# Patient Record
Sex: FEMALE | Race: WHITE | NOT HISPANIC OR LATINO | Employment: OTHER | URBAN - METROPOLITAN AREA
[De-identification: names, ages, dates, MRNs, and addresses within clinical notes are randomized per-mention and may not be internally consistent; named-entity substitution may affect disease eponyms.]

---

## 2021-03-19 ENCOUNTER — IMMUNIZATION (OUTPATIENT)
Dept: NURSING | Facility: CLINIC | Age: 66
End: 2021-03-19
Payer: OTHER GOVERNMENT

## 2021-03-19 PROCEDURE — 0011A PR COVID VAC MODERNA 100 MCG/0.5 ML IM: CPT

## 2021-03-19 PROCEDURE — 91301 PR COVID VAC MODERNA 100 MCG/0.5 ML IM: CPT

## 2021-04-16 ENCOUNTER — IMMUNIZATION (OUTPATIENT)
Dept: NURSING | Facility: CLINIC | Age: 66
End: 2021-04-16
Attending: PEDIATRICS
Payer: OTHER GOVERNMENT

## 2021-04-16 PROCEDURE — 0012A PR COVID VAC MODERNA 100 MCG/0.5 ML IM: CPT

## 2021-04-16 PROCEDURE — 91301 PR COVID VAC MODERNA 100 MCG/0.5 ML IM: CPT

## 2021-05-01 ENCOUNTER — HEALTH MAINTENANCE LETTER (OUTPATIENT)
Age: 66
End: 2021-05-01

## 2021-10-11 ENCOUNTER — HEALTH MAINTENANCE LETTER (OUTPATIENT)
Age: 66
End: 2021-10-11

## 2022-05-22 ENCOUNTER — HEALTH MAINTENANCE LETTER (OUTPATIENT)
Age: 67
End: 2022-05-22

## 2022-09-25 ENCOUNTER — HEALTH MAINTENANCE LETTER (OUTPATIENT)
Age: 67
End: 2022-09-25

## 2023-01-30 ENCOUNTER — HEALTH MAINTENANCE LETTER (OUTPATIENT)
Age: 68
End: 2023-01-30

## 2023-05-08 ENCOUNTER — HEALTH MAINTENANCE LETTER (OUTPATIENT)
Age: 68
End: 2023-05-08

## 2024-03-02 ENCOUNTER — HEALTH MAINTENANCE LETTER (OUTPATIENT)
Age: 69
End: 2024-03-02

## 2024-08-15 ENCOUNTER — APPOINTMENT (OUTPATIENT)
Dept: RADIOLOGY | Facility: HOSPITAL | Age: 69
DRG: 195 | End: 2024-08-15
Attending: EMERGENCY MEDICINE
Payer: COMMERCIAL

## 2024-08-15 ENCOUNTER — HOSPITAL ENCOUNTER (INPATIENT)
Facility: HOSPITAL | Age: 69
LOS: 2 days | Discharge: HOME | DRG: 195 | End: 2024-08-17
Attending: EMERGENCY MEDICINE | Admitting: INTERNAL MEDICINE
Payer: COMMERCIAL

## 2024-08-15 DIAGNOSIS — J18.9 COMMUNITY ACQUIRED PNEUMONIA, UNSPECIFIED LATERALITY: ICD-10-CM

## 2024-08-15 LAB
ANION GAP SERPL CALCULATED.3IONS-SCNC: 12 MMOL/L (ref 7–15)
BUN SERPL-MCNC: 10.9 MG/DL (ref 8–23)
C PNEUM DNA SPEC QL NAA+PROBE: NOT DETECTED
CALCIUM SERPL-MCNC: 9.2 MG/DL (ref 8.8–10.4)
CHLORIDE SERPL-SCNC: 103 MMOL/L (ref 98–107)
CREAT SERPL-MCNC: 0.7 MG/DL (ref 0.51–0.95)
EGFRCR SERPLBLD CKD-EPI 2021: >90 ML/MIN/1.73M2
ERYTHROCYTE [DISTWIDTH] IN BLOOD BY AUTOMATED COUNT: 12.5 % (ref 10–15)
FLUAV H1 2009 PAND RNA SPEC QL NAA+PROBE: NOT DETECTED
FLUAV H1 RNA SPEC QL NAA+PROBE: NOT DETECTED
FLUAV H3 RNA SPEC QL NAA+PROBE: NOT DETECTED
FLUAV RNA SPEC QL NAA+PROBE: NEGATIVE
FLUAV RNA SPEC QL NAA+PROBE: NOT DETECTED
FLUBV RNA RESP QL NAA+PROBE: NEGATIVE
FLUBV RNA SPEC QL NAA+PROBE: NOT DETECTED
GLUCOSE SERPL-MCNC: 100 MG/DL (ref 70–99)
HADV DNA SPEC QL NAA+PROBE: NOT DETECTED
HCO3 SERPL-SCNC: 25 MMOL/L (ref 22–29)
HCOV PNL SPEC NAA+PROBE: NOT DETECTED
HCT VFR BLD AUTO: 42.9 % (ref 35–47)
HGB BLD-MCNC: 14.4 G/DL (ref 11.7–15.7)
HMPV RNA SPEC QL NAA+PROBE: NOT DETECTED
HPIV1 RNA SPEC QL NAA+PROBE: NOT DETECTED
HPIV2 RNA SPEC QL NAA+PROBE: NOT DETECTED
HPIV3 RNA SPEC QL NAA+PROBE: NOT DETECTED
HPIV4 RNA SPEC QL NAA+PROBE: NOT DETECTED
L PNEUMO1 AG UR QL IA: NEGATIVE
M PNEUMO DNA SPEC QL NAA+PROBE: NOT DETECTED
MCH RBC QN AUTO: 32.6 PG (ref 26.5–33)
MCHC RBC AUTO-ENTMCNC: 33.6 G/DL (ref 31.5–36.5)
MCV RBC AUTO: 97 FL (ref 78–100)
NT-PROBNP SERPL-MCNC: <36 PG/ML (ref 0–900)
PLATELET # BLD AUTO: 282 10E3/UL (ref 150–450)
POTASSIUM SERPL-SCNC: 4.2 MMOL/L (ref 3.4–5.3)
PROCALCITONIN SERPL IA-MCNC: 0.04 NG/ML
RBC # BLD AUTO: 4.42 10E6/UL (ref 3.8–5.2)
RSV RNA SPEC NAA+PROBE: NEGATIVE
RSV RNA SPEC QL NAA+PROBE: NOT DETECTED
RSV RNA SPEC QL NAA+PROBE: NOT DETECTED
RV+EV RNA SPEC QL NAA+PROBE: NOT DETECTED
S PNEUM AG SPEC QL: NEGATIVE
SARS-COV-2 RNA RESP QL NAA+PROBE: NEGATIVE
SODIUM SERPL-SCNC: 140 MMOL/L (ref 135–145)
TROPONIN T SERPL HS-MCNC: 7 NG/L
WBC # BLD AUTO: 9.4 10E3/UL (ref 4–11)

## 2024-08-15 PROCEDURE — 87633 RESP VIRUS 12-25 TARGETS: CPT | Performed by: INTERNAL MEDICINE

## 2024-08-15 PROCEDURE — 83880 ASSAY OF NATRIURETIC PEPTIDE: CPT | Performed by: INTERNAL MEDICINE

## 2024-08-15 PROCEDURE — 84145 PROCALCITONIN (PCT): CPT | Performed by: INTERNAL MEDICINE

## 2024-08-15 PROCEDURE — 87637 SARSCOV2&INF A&B&RSV AMP PRB: CPT | Performed by: INTERNAL MEDICINE

## 2024-08-15 PROCEDURE — G0378 HOSPITAL OBSERVATION PER HR: HCPCS

## 2024-08-15 PROCEDURE — 250N000013 HC RX MED GY IP 250 OP 250 PS 637: Performed by: INTERNAL MEDICINE

## 2024-08-15 PROCEDURE — 99223 1ST HOSP IP/OBS HIGH 75: CPT | Performed by: INTERNAL MEDICINE

## 2024-08-15 PROCEDURE — 80048 BASIC METABOLIC PNL TOTAL CA: CPT | Performed by: EMERGENCY MEDICINE

## 2024-08-15 PROCEDURE — 120N000001 HC R&B MED SURG/OB

## 2024-08-15 PROCEDURE — 258N000003 HC RX IP 258 OP 636: Performed by: EMERGENCY MEDICINE

## 2024-08-15 PROCEDURE — 96374 THER/PROPH/DIAG INJ IV PUSH: CPT | Mod: 59

## 2024-08-15 PROCEDURE — 250N000011 HC RX IP 250 OP 636: Performed by: EMERGENCY MEDICINE

## 2024-08-15 PROCEDURE — 99285 EMERGENCY DEPT VISIT HI MDM: CPT | Mod: 25

## 2024-08-15 PROCEDURE — 96375 TX/PRO/DX INJ NEW DRUG ADDON: CPT

## 2024-08-15 PROCEDURE — 250N000009 HC RX 250: Performed by: EMERGENCY MEDICINE

## 2024-08-15 PROCEDURE — 94640 AIRWAY INHALATION TREATMENT: CPT

## 2024-08-15 PROCEDURE — 94640 AIRWAY INHALATION TREATMENT: CPT | Mod: 76

## 2024-08-15 PROCEDURE — 36415 COLL VENOUS BLD VENIPUNCTURE: CPT | Performed by: EMERGENCY MEDICINE

## 2024-08-15 PROCEDURE — 87205 SMEAR GRAM STAIN: CPT | Performed by: INTERNAL MEDICINE

## 2024-08-15 PROCEDURE — 36415 COLL VENOUS BLD VENIPUNCTURE: CPT | Performed by: INTERNAL MEDICINE

## 2024-08-15 PROCEDURE — 250N000011 HC RX IP 250 OP 636: Performed by: INTERNAL MEDICINE

## 2024-08-15 PROCEDURE — 84484 ASSAY OF TROPONIN QUANT: CPT | Performed by: INTERNAL MEDICINE

## 2024-08-15 PROCEDURE — 96372 THER/PROPH/DIAG INJ SC/IM: CPT

## 2024-08-15 PROCEDURE — 87899 AGENT NOS ASSAY W/OPTIC: CPT | Performed by: INTERNAL MEDICINE

## 2024-08-15 PROCEDURE — 999N000157 HC STATISTIC RCP TIME EA 10 MIN

## 2024-08-15 PROCEDURE — 999N000156 HC STATISTIC RCP CONSULT EA 30 MIN

## 2024-08-15 PROCEDURE — 71046 X-RAY EXAM CHEST 2 VIEWS: CPT

## 2024-08-15 PROCEDURE — 85027 COMPLETE CBC AUTOMATED: CPT | Performed by: EMERGENCY MEDICINE

## 2024-08-15 PROCEDURE — 87040 BLOOD CULTURE FOR BACTERIA: CPT | Performed by: INTERNAL MEDICINE

## 2024-08-15 PROCEDURE — 250N000009 HC RX 250: Performed by: INTERNAL MEDICINE

## 2024-08-15 PROCEDURE — 96365 THER/PROPH/DIAG IV INF INIT: CPT

## 2024-08-15 RX ORDER — ALBUTEROL SULFATE 90 UG/1
2 AEROSOL, METERED RESPIRATORY (INHALATION) EVERY 6 HOURS PRN
Status: ON HOLD | COMMUNITY
End: 2024-08-17

## 2024-08-15 RX ORDER — CALCIUM CARBONATE/VITAMIN D3 600 MG-10
1 TABLET ORAL DAILY
COMMUNITY

## 2024-08-15 RX ORDER — PROCHLORPERAZINE MALEATE 5 MG
5 TABLET ORAL EVERY 6 HOURS PRN
Status: DISCONTINUED | OUTPATIENT
Start: 2024-08-15 | End: 2024-08-17 | Stop reason: HOSPADM

## 2024-08-15 RX ORDER — ONDANSETRON 2 MG/ML
4 INJECTION INTRAMUSCULAR; INTRAVENOUS EVERY 6 HOURS PRN
Status: DISCONTINUED | OUTPATIENT
Start: 2024-08-15 | End: 2024-08-17 | Stop reason: HOSPADM

## 2024-08-15 RX ORDER — IPRATROPIUM BROMIDE AND ALBUTEROL SULFATE 2.5; .5 MG/3ML; MG/3ML
3 SOLUTION RESPIRATORY (INHALATION) EVERY 4 HOURS PRN
Status: DISCONTINUED | OUTPATIENT
Start: 2024-08-15 | End: 2024-08-15

## 2024-08-15 RX ORDER — CEFTRIAXONE 1 G/1
1 INJECTION, POWDER, FOR SOLUTION INTRAMUSCULAR; INTRAVENOUS EVERY 24 HOURS
Status: DISCONTINUED | OUTPATIENT
Start: 2024-08-16 | End: 2024-08-16

## 2024-08-15 RX ORDER — AMOXICILLIN 250 MG
2 CAPSULE ORAL 2 TIMES DAILY PRN
Status: DISCONTINUED | OUTPATIENT
Start: 2024-08-15 | End: 2024-08-17 | Stop reason: HOSPADM

## 2024-08-15 RX ORDER — LANOLIN ALCOHOL/MO/W.PET/CERES
3 CREAM (GRAM) TOPICAL
Status: DISCONTINUED | OUTPATIENT
Start: 2024-08-15 | End: 2024-08-15

## 2024-08-15 RX ORDER — MULTIVITAMIN,THERAPEUTIC
1 TABLET ORAL DAILY
COMMUNITY

## 2024-08-15 RX ORDER — POLYETHYLENE GLYCOL 3350 17 G/17G
17 POWDER, FOR SOLUTION ORAL 2 TIMES DAILY PRN
Status: DISCONTINUED | OUTPATIENT
Start: 2024-08-15 | End: 2024-08-17 | Stop reason: HOSPADM

## 2024-08-15 RX ORDER — ACETAMINOPHEN 650 MG/1
650 SUPPOSITORY RECTAL EVERY 4 HOURS PRN
Status: DISCONTINUED | OUTPATIENT
Start: 2024-08-15 | End: 2024-08-17 | Stop reason: HOSPADM

## 2024-08-15 RX ORDER — IPRATROPIUM BROMIDE AND ALBUTEROL SULFATE 2.5; .5 MG/3ML; MG/3ML
3 SOLUTION RESPIRATORY (INHALATION) 4 TIMES DAILY
Status: DISCONTINUED | OUTPATIENT
Start: 2024-08-15 | End: 2024-08-17 | Stop reason: HOSPADM

## 2024-08-15 RX ORDER — PHENOL 1.4 %
10 AEROSOL, SPRAY (ML) MUCOUS MEMBRANE
COMMUNITY

## 2024-08-15 RX ORDER — MULTIVITAMIN,THERAPEUTIC
1 TABLET ORAL DAILY
Status: DISCONTINUED | OUTPATIENT
Start: 2024-08-15 | End: 2024-08-17 | Stop reason: HOSPADM

## 2024-08-15 RX ORDER — ONDANSETRON 4 MG/1
4 TABLET, ORALLY DISINTEGRATING ORAL EVERY 6 HOURS PRN
Status: DISCONTINUED | OUTPATIENT
Start: 2024-08-15 | End: 2024-08-17 | Stop reason: HOSPADM

## 2024-08-15 RX ORDER — POLYETHYLENE GLYCOL 3350 17 G/17G
0.25 POWDER, FOR SOLUTION ORAL DAILY
COMMUNITY

## 2024-08-15 RX ORDER — HYDRALAZINE HYDROCHLORIDE 20 MG/ML
10 INJECTION INTRAMUSCULAR; INTRAVENOUS EVERY 4 HOURS PRN
Status: DISCONTINUED | OUTPATIENT
Start: 2024-08-15 | End: 2024-08-17 | Stop reason: HOSPADM

## 2024-08-15 RX ORDER — ACETAMINOPHEN 325 MG/1
650 TABLET ORAL EVERY 4 HOURS PRN
Status: DISCONTINUED | OUTPATIENT
Start: 2024-08-15 | End: 2024-08-17 | Stop reason: HOSPADM

## 2024-08-15 RX ORDER — AMOXICILLIN 250 MG
1 CAPSULE ORAL 2 TIMES DAILY PRN
Status: DISCONTINUED | OUTPATIENT
Start: 2024-08-15 | End: 2024-08-17 | Stop reason: HOSPADM

## 2024-08-15 RX ORDER — VITAMIN B COMPLEX
50 TABLET ORAL DAILY
Status: DISCONTINUED | OUTPATIENT
Start: 2024-08-15 | End: 2024-08-17 | Stop reason: HOSPADM

## 2024-08-15 RX ORDER — IPRATROPIUM BROMIDE AND ALBUTEROL SULFATE 2.5; .5 MG/3ML; MG/3ML
3 SOLUTION RESPIRATORY (INHALATION) ONCE
Status: COMPLETED | OUTPATIENT
Start: 2024-08-15 | End: 2024-08-15

## 2024-08-15 RX ORDER — BENZONATATE 100 MG/1
200 CAPSULE ORAL 3 TIMES DAILY PRN
Status: DISCONTINUED | OUTPATIENT
Start: 2024-08-15 | End: 2024-08-17 | Stop reason: HOSPADM

## 2024-08-15 RX ORDER — GUAIFENESIN/DEXTROMETHORPHAN 100-10MG/5
10 SYRUP ORAL EVERY 4 HOURS PRN
Status: DISCONTINUED | OUTPATIENT
Start: 2024-08-15 | End: 2024-08-17 | Stop reason: HOSPADM

## 2024-08-15 RX ORDER — CALCIUM CARBONATE 500 MG/1
1000 TABLET, CHEWABLE ORAL 4 TIMES DAILY PRN
Status: DISCONTINUED | OUTPATIENT
Start: 2024-08-15 | End: 2024-08-17 | Stop reason: HOSPADM

## 2024-08-15 RX ORDER — CEFTRIAXONE 1 G/1
1 INJECTION, POWDER, FOR SOLUTION INTRAMUSCULAR; INTRAVENOUS ONCE
Status: COMPLETED | OUTPATIENT
Start: 2024-08-15 | End: 2024-08-15

## 2024-08-15 RX ORDER — ENOXAPARIN SODIUM 100 MG/ML
40 INJECTION SUBCUTANEOUS EVERY 24 HOURS
Status: DISCONTINUED | OUTPATIENT
Start: 2024-08-15 | End: 2024-08-17 | Stop reason: HOSPADM

## 2024-08-15 RX ORDER — PROCHLORPERAZINE 25 MG
12.5 SUPPOSITORY, RECTAL RECTAL EVERY 12 HOURS PRN
Status: DISCONTINUED | OUTPATIENT
Start: 2024-08-15 | End: 2024-08-17 | Stop reason: HOSPADM

## 2024-08-15 RX ADMIN — IPRATROPIUM BROMIDE AND ALBUTEROL SULFATE 3 ML: .5; 3 SOLUTION RESPIRATORY (INHALATION) at 17:09

## 2024-08-15 RX ADMIN — IPRATROPIUM BROMIDE AND ALBUTEROL SULFATE 3 ML: .5; 3 SOLUTION RESPIRATORY (INHALATION) at 14:27

## 2024-08-15 RX ADMIN — IPRATROPIUM BROMIDE AND ALBUTEROL SULFATE 3 ML: .5; 3 SOLUTION RESPIRATORY (INHALATION) at 20:41

## 2024-08-15 RX ADMIN — ENOXAPARIN SODIUM 40 MG: 40 INJECTION SUBCUTANEOUS at 21:45

## 2024-08-15 RX ADMIN — IPRATROPIUM BROMIDE AND ALBUTEROL SULFATE 3 ML: .5; 3 SOLUTION RESPIRATORY (INHALATION) at 09:45

## 2024-08-15 RX ADMIN — AZITHROMYCIN 500 MG: 500 INJECTION, POWDER, LYOPHILIZED, FOR SOLUTION INTRAVENOUS at 10:47

## 2024-08-15 RX ADMIN — Medication 50 MCG: at 15:00

## 2024-08-15 RX ADMIN — THERA TABS 1 TABLET: TAB at 15:00

## 2024-08-15 RX ADMIN — CEFTRIAXONE SODIUM 1 G: 1 INJECTION, POWDER, FOR SOLUTION INTRAMUSCULAR; INTRAVENOUS at 10:03

## 2024-08-15 RX ADMIN — Medication 1 TABLET: at 15:00

## 2024-08-15 ASSESSMENT — ACTIVITIES OF DAILY LIVING (ADL)
ADLS_ACUITY_SCORE: 35
ADLS_ACUITY_SCORE: 18
ADLS_ACUITY_SCORE: 35
ADLS_ACUITY_SCORE: 18

## 2024-08-15 ASSESSMENT — COLUMBIA-SUICIDE SEVERITY RATING SCALE - C-SSRS
6. HAVE YOU EVER DONE ANYTHING, STARTED TO DO ANYTHING, OR PREPARED TO DO ANYTHING TO END YOUR LIFE?: NO
2. HAVE YOU ACTUALLY HAD ANY THOUGHTS OF KILLING YOURSELF IN THE PAST MONTH?: NO
1. IN THE PAST MONTH, HAVE YOU WISHED YOU WERE DEAD OR WISHED YOU COULD GO TO SLEEP AND NOT WAKE UP?: NO

## 2024-08-15 NOTE — H&P
[facility]    History and Physical - Hospitalist Service       Date of Admission:  8/15/2024    Assessment & Plan   Active Problems:    Community acquired pneumonia, unspecified laterality       Gay Dennison is a 68 year old female with history of recent pneumonia/bronchitis (8/5/24) who is admitted on 8/15/2024 with concern for recurrent pneumonia and respiratory distress.     Acute respiratory distress  [place]  Concern for recurrent pneumonia  No evidence of hypoxia   Not improved s/p course of levaquin and limited course of doxycycline and prednisone.   CTA chest from 3 days prior to admission showed bronchial inflammation with mucus plugging and patchy pulmonary infiltrates all typical of pneumonia.   CXR on day of admission shows bronchial wall thickening with similar appearing reticulonodular opacities left upper lobe, lingula and right lower lobe compatible with bronchitis/bronchiolitis.   Procal normal and WBC normal  BNP also normal  -- recheck COVID/RSV/Flu and respiratory viral panel  -- check urine legionella and strep pneumo antigen  -- check blood and sputum cultures  -- started on ceftriaxone and azithromycin in the ED, will continue for now  -- consider restarting course of steroids pending above workup  -- start duonebs, tessalon and robitussin      H/O insomnia: continue home melatonin       Diet: Regular Diet Adult    DVT Prophylaxis: Enoxaparin (Lovenox) SQ  Freeman Catheter: Not present  Lines: None     Cardiac Monitoring: None  Code Status: Full Code      Clinically Significant Risk Factors Present on Admission                                           Disposition Plan      Expected Discharge Date: 08/17/2024             Medically Ready for Discharge: Anticipated Tomorrow      Gavin Hanson DO  Hospitalist Service  [facility]  Securely message with [employer] (more info)  Text page via [employer] Paging/Directory      ______________________________________________________________________    Chief Complaint   SOB    History is obtained from the patient    History of Present Illness   Gay Dennison is a 68 year old female who was recently treated for CAP with course of doxycicline, steroids and then course of levaquin and now presents ongoing SOB and productive cough with associate fatigue      Past Medical History    No past medical history on file.    Past Surgical History   No past surgical history on file.    Prior to Admission Medications   Prior to Admission Medications   Prescriptions Last Dose Informant Patient Reported? Taking?   Melatonin 10 MG TABS tablet Unknown at prn  Yes Yes   Sig: Take 10 mg by mouth nightly as needed for sleep   albuterol (PROAIR HFA/PROVENTIL HFA/VENTOLIN HFA) 108 (90 Base) MCG/ACT inhaler Unknown at prn  Yes Yes   Sig: Inhale 2 puffs into the lungs every 6 hours as needed for shortness of breath, wheezing or cough   calcium carbonate-vitamin D (CALTRATE) 600-10 MG-MCG per tablet 8/14/2024 at am  Yes Yes   Sig: Take 1 tablet by mouth daily   cholecalciferol (VITAMIN D3) 25 mcg (1000 units) capsule 8/14/2024 at am  Yes Yes   Sig: Take 2 capsules by mouth daily   multivitamin, therapeutic (THERA-VIT) TABS tablet 8/14/2024 at am`c  Yes Yes   Sig: Take 1 tablet by mouth daily   polyethylene glycol (MIRALAX) 17 GM/Dose powder 8/14/2024 at am  Yes Yes   Sig: Take 0.25 Capfuls by mouth daily      Facility-Administered Medications: None           Physical Exam   Vital Signs: Temp: 98  F (36.7  C) Temp src: Oral BP: 103/56 Pulse: 94   Resp: 20 SpO2: 91 % O2 Device: None (Room air)    Weight: 104 lbs 11.5 oz    General Appearance: In no acute distress  RESPIRATORY: Respirations nonlabored  CARDIOVASCULAR: No le edema bilat.  NEUROLOGIC: No focal arm or leg  weakness, speech is clear         Medical Decision Making       >75 MINUTES SPENT BY ME on the date of service doing chart review,  history, exam, documentation & further activities per the note.      Data

## 2024-08-15 NOTE — ED TRIAGE NOTES
Diagnosed to have pneumonia  and was given for 5 days and felt better but yesterday felt that it is hard to breath.  Still has occasional productive cough, yellowish phlegm.  Tied Mucinex for a day but did not help.  CT of chest done at [facility] and was negative PE     Triage Assessment (Adult)       Row Name 08/15/24 0733          Triage Assessment    Airway WDL WDL        Respiratory WDL    Respiratory WDL cough;X  feeling short of breath     Cough Frequency infrequent        Skin Circulation/Temperature WDL    Skin Circulation/Temperature WDL WDL        Cardiac WDL    Cardiac WDL WDL        Peripheral/Neurovascular WDL    Peripheral Neurovascular WDL WDL        Cognitive/Neuro/Behavioral WDL    Cognitive/Neuro/Behavioral WDL WDL

## 2024-08-15 NOTE — PHARMACY-ADMISSION MEDICATION HISTORY
Pharmacist Admission Medication History    Admission medication history is complete. The information provided in this note is only as accurate as the sources available at the time of the update.    Information Source(s): Patient and CareEverywhere/SureScripts via in-person    Pertinent Information: just takes OTC meds, has received both PO doxy and levaquin to treat PNA    Changes made to PTA medication list:  Added: all  Deleted: None  Changed: None    Allergies reviewed with patient and updates made in EHR: yes    Medication History Completed By: ZAHIDA CASTANEDA [facility] 8/15/2024 11:12 AM    PTA Med List   Medication Sig Last Dose    albuterol (PROAIR HFA/PROVENTIL HFA/VENTOLIN HFA) 108 (90 Base) MCG/ACT inhaler Inhale 2 puffs into the lungs every 6 hours as needed for shortness of breath, wheezing or cough Unknown at prn    calcium carbonate-vitamin D (CALTRATE) 600-10 MG-MCG per tablet Take 1 tablet by mouth daily 8/14/2024 at am    cholecalciferol (VITAMIN D3) 25 mcg (1000 units) capsule Take 2 capsules by mouth daily 8/14/2024 at am    Melatonin 10 MG TABS tablet Take 10 mg by mouth nightly as needed for sleep Unknown at prn    multivitamin, therapeutic (THERA-VIT) TABS tablet Take 1 tablet by mouth daily 8/14/2024 at am`c    polyethylene glycol (MIRALAX) 17 GM/Dose powder Take 0.25 Capfuls by mouth daily 8/14/2024 at am

## 2024-08-15 NOTE — PLAN OF CARE
RCAT Treatment Plan    Patient Score: 9  Patient Acuity: 4    Clinical Indication for Therapy: Pneumonia    Therapy Ordered: Duoneb QID and PRN    Assessment Summary: Patient is on room air. Breath sounds are inspiratory, expiratory wheezes and fine crackles. Plan is to continue current therapy per MD orders and re-evaluate in 72 hours.    Leah Harman, RT  8/15/2024

## 2024-08-15 NOTE — ED NOTES
"[facility] ED Handoff Report    ED Chief Complaint: shortness of breath    ED Diagnosis:  (J18.9) Community acquired pneumonia, unspecified laterality       PMH:  No past medical history on file.     Code Status:  Full Code     Falls Risk: No Band: Not applicable    Current Living Situation/Residence: lives in a house     Elimination Status: Continent: Yes     Activity Level: Independent    Patients Preferred Language:  English     Needed: No    Vital Signs:  /61   Pulse 78   Temp 98  F (36.7  C) (Temporal)   Resp 23   Ht 1.562 m (5' 1.5\")   Wt 47.5 kg (104 lb 11.5 oz)   SpO2 93%   BMI 19.47 kg/m       Cardiac Rhythm: NA    Pain Score: 0/10    Is the Patient Confused:  No    Last Food or Drink: 08/15/24 at breakfast    Focused Assessment:  Alert and oriented. Denies pain. Shortness of breath with heavy exertion.  Walks independently well. Was told by primary MD that she was to be on bedrest so she's a bit frustrated through the process.     Tests Performed: Done: Labs and Imaging    Treatments Provided:  IV abx    Family Dynamics/Concerns: No    Family Updated On Visitor Policy: No    Plan of Care Communicated to Family: No    Who Was Updated about Plan of Care: can update family self    Belongings Checklist Done and Signed by Patient: Yes    Belongings Sent with Patient: clothing, purse    Medications sent with patient: NA    Covid: symptomatic, negative    Additional Information:     RN: Kalyn Vazquez RN 8/15/2024 11:41 AM        "

## 2024-08-15 NOTE — ED PROVIDER NOTES
EMERGENCY DEPARTMENT ENCOUnter      NAME: Gay Dennison  AGE: 68 year old female  YOB: 1955  MRN: [id number]  EVALUATION DATE & TIME: 8/15/2024  7:41 AM    PCP: No Ref-Primary, Physician    ED PROVIDER: Brian Elaine DO      Chief Complaint   Patient presents with    Shortness of Breath         FINAL IMPRESSION:  1. Community acquired pneumonia, unspecified laterality          ED COURSE & MEDICAL DECISION MAKIN:30 AM I met with the patient, obtained history, performed an initial exam, and discussed options and plan for diagnostics and treatment here in the ED.  9:32 AM Spoke with Radiologist.  9:39 AM Rechecked and updated patient on lab and imaging results. Discussed plan for admission and starting IV antibiotics and patient is agreeable.  10:12 AM Spoke with Dr. Hanson, Hospitalist, regarding plan for admission. Patient is accepted for admission.      The patient presented to the emergency department today complaining of shortness of breath.  She was diagnosed with pneumonia last week and finished a course of Levaquin but states that her symptoms have not improved.  She has been vitally stable throughout her ER stay.  Chest x-ray today shows more significant findings then to her imaging last week.  Given that she has failed outpatient therapy, we will start IV antibiotics and keep her in the hospital.  She is comfortable with this plan.        Medical Decision Making  Obtained supplemental history:Supplemental history obtained?: Documented in chart  Reviewed external records: External records reviewed?: Outpatient Record: UC visits at [facility] for persistent worsening productive cough, body aches, fevers, chest congestion, mild shortness of breath, 1 week of sore throat, and rib pain secondary to coughing on 8/3/24 and 24. Follow up visit at [facility] and CT chest PE study on 24  Care impacted by chronic illness:N/A  Care  significantly affected by social determinants of health:Access to Affordable Health Care  Did you consider but not order tests?: Work up considered but not performed and documented in chart, if applicable  Did you interpret images independently?: Independent interpretation of ECG and images noted in documentation, when applicable.  Consultation discussion with other provider:Did you involve another provider (consultant, , pharmacy, etc.)?: I discussed the care with another health care provider, see documentation for details.  Admit.    At the conclusion of the encounter I discussed the results of all of the tests and the disposition. The questions were answered. The patient or family acknowledged understanding and was agreeable with the care plan.         =================================================================    HPI        Gay Dennison is a 68 year old female with no pertinent medical history, who presents to this ED via private car for evaluation of persistent shortness of breath.    Per chart review, the patient presented to [facility] for worsening cough, body aches, and fevers on 8/3/24. Patient took an at-home COVID-19 test, which came back negative. No personal history of reactive airway disease. She was given a nebulizer treatment at the ED. She was discharged with an albuterol inhaler, 20 mg of prednisone, and 100 mg of doxycycline (to take from 8/3 until 8/10).    She presented to [facility] again on 8/5/24 with complaint of 1.5 weeks of persistent worsening/significant productive cough, chest congestion, mild shortness of breath, 1 week of sore throat, and rib pain secondary to coughing. Her COVID test at home was negative. Patient had a chest XR that showed that her heart size and pulmonary vascularity was normal. There may have been some bronchiectasis in the upper lungs, but no consolidative infiltrate or effusion was found. She was  "discharged with 750 mg of levofloxacin after this visit.    Patient presented [facility] for follow-up bronchitis presumed bronchial on 8/12/24. She reported switch to Levaquin abx has helped dramatically and she felt much better. Noted she hasn't been using her inhaler and has christopher going on short walks without issues. She had a CT chest PE study done on visit, which showed infiltrate bilaterally and bronchial plugging, but no PE.     Patient reports she was diagnosed with pneumonia 10 days ago (on 8/5) after presenting to  at [facility] for a worsening productive cough, chest congestion, mild shortness of breath, and rib pain secondary to coughing. She had a chest XR done at  on 8/12 to rule out PE because her O2 sats were low, noting scan was negative for PE and cancer. Notes she still feels short of breath yesterday and today and her breathing feels \"tight\". She finished a 5-day course of levofloxacin from 8/5 until 8/9 and reports the past 3 days, she hasn't been improving much. States her cough subsided substantially after finishing course of levofloxacin.    This shortness of breath worsens with exertion and notes she was told by the  provider that she shouldn't exert herself much and rest more. She endorses back tightness, which is normal for her. She hasn't been using the inhaler she was given at  for several weeks and states it didn't help much. She denies chest pain or persistent coughing. No other reported complaints or concerns at this time.      PAST MEDICAL HISTORY:  No past medical history on file.    PAST SURGICAL HISTORY:  No past surgical history on file.        CURRENT MEDICATIONS:    No current outpatient medications on file.      ALLERGIES:  No Known Allergies    FAMILY HISTORY:  No family history on file.    SOCIAL HISTORY:   Social History     Socioeconomic History    Marital status:      Social Determinants of Health     Financial " "Resource Strain: Low Risk  (2/13/2024)    Received from [facility]    Financial Resource Strain     Difficulty of Paying Living Expenses: 3   Food Insecurity: No Food Insecurity (2/13/2024)    Received from [facility]    Food Insecurity     Worried About Running Out of Food in the Last Year: 1   Transportation Needs: No Transportation Needs (2/13/2024)    Received from [facility]    Transportation Needs     Lack of Transportation (Medical): 1   Social Connections: Socially Integrated (2/13/2024)    Received from [facility]    Social Connections     Frequency of Communication with Friends and Family: 0   Housing Stability: Low Risk  (2/13/2024)    Received from [facility]    Housing Stability     Unable to Pay for Housing in the Last Year: 1       VITALS:  Patient Vitals for the past 24 hrs:   BP Temp Temp src Pulse Resp SpO2 Height Weight   08/15/24 1427 -- -- -- -- -- 93 % -- --   08/15/24 1217 103/56 98  F (36.7  C) Oral 94 20 91 % -- --   08/15/24 0945 120/61 -- -- 78 23 93 % -- --   08/15/24 0930 115/57 -- -- 85 28 93 % 1.562 m (5' 1.5\") --   08/15/24 0830 116/59 -- -- 75 -- 91 % -- --   08/15/24 0810 114/62 -- -- 80 -- 93 % -- --   08/15/24 0800 115/71 -- -- 74 -- 92 % -- --   08/15/24 0731 125/61 98  F (36.7  C) Temporal 83 22 93 % -- 47.5 kg (104 lb 11.5 oz)       PHYSICAL EXAM    Constitutional:  Well developed, Well nourished,  HENT:  Normocephalic, Atraumatic, Oropharynx moist, Nose normal.   Eyes:  EOMI, Conjunctiva normal, No discharge.   Respiratory:  Normal breath sounds, No respiratory distress, No wheezing, No chest tenderness.   Cardiovascular:  Normal heart rate, Normal rhythm, No murmurs  GI:  Soft, No tenderness, No guarding, No CVA tenderness.   Musculoskeletal:  No tenderness to palpation or major deformities noted. "   Extremities: No lower extremity edema.  Neurologic:  Alert & oriented x 3, No focal deficits noted.   Psychiatric:  Affect normal, Judgment normal, Mood normal.        LAB:  All pertinent labs reviewed and interpreted.  Results for orders placed or performed during the hospital encounter of 08/15/24              CBC with platelets   Result Value Ref Range    WBC Count 9.4 4.0 - 11.0 10e3/uL    RBC Count 4.42 3.80 - 5.20 10e6/uL    Hemoglobin 14.4 11.7 - 15.7 g/dL    Hematocrit 42.9 35.0 - 47.0 %    MCV 97 78 - 100 fL    MCH 32.6 26.5 - 33.0 pg    MCHC 33.6 31.5 - 36.5 g/dL    RDW 12.5 10.0 - 15.0 %    Platelet Count 282 150 - 450 10e3/uL   Basic metabolic panel   Result Value Ref Range    Sodium 140 135 - 145 mmol/L    Potassium 4.2 3.4 - 5.3 mmol/L    Chloride 103 98 - 107 mmol/L    Carbon Dioxide (CO2) 25 22 - 29 mmol/L    Anion Gap 12 7 - 15 mmol/L    Urea Nitrogen 10.9 8.0 - 23.0 mg/dL    Creatinine 0.70 0.51 - 0.95 mg/dL    GFR Estimate >90 >60 mL/min/1.73m2    Calcium 9.2 8.8 - 10.4 mg/dL    Glucose 100 (H) 70 - 99 mg/dL   Result Value Ref Range    Procalcitonin 0.04 <0.50 ng/mL   N terminal pro BNP outpatient   Result Value Ref Range    N Terminal Pro BNP Outpatient <36 0 - 900 pg/mL   Symptomatic Influenza A/B, RSV, & SARS-CoV2 PCR (COVID-19) Nose    Specimen: Nose; Swab   Result Value Ref Range    Influenza A PCR Negative Negative    Influenza B PCR Negative Negative    RSV PCR Negative Negative    SARS CoV2 PCR Negative Negative   Result Value Ref Range    Troponin T, High Sensitivity 7 <=14 ng/L   Respiratory Aerobic Bacterial Culture    Specimen: Expectorate; Sputum   Result Value Ref Range    Gram Stain Result <10 Squamous epithelial cells/low power field     Gram Stain Result >25 PMNs/low power field     Gram Stain Result 2+ Mixed arvin    Legionella Urinary Antigen and Streptococcus pneumoniae antigen    Specimen: Urine, Clean Catch   Result Value Ref Range    Legionella pneumophila serogroup 1  urinary antigen Negative Negative    Streptococcus pneumoniae antigen Negative Negative       RADIOLOGY:  I have independently reviewed and interpreted the above imaging, pending the final radiology read.  XR Chest 2 Views   Final Result   IMPRESSION: The cardiomediastinal silhouette and pulmonary vascularity are normal. Nipple shadow overlies the right lower lobe. Bronchial wall thickening with similar appearing reticulonodular opacities left upper lobe, lingula and right lower lobe    compatible with bronchitis/bronchiolitis. No focal consolidation, pneumothorax nor pleural effusion. Pectus excavatum deformity redemonstrated is              I, Victoria Marlow, am serving as a scribe to document services personally performed by Dr. Elaine based on my observation and the provider's statements to me. I, Brian Elaine, DO attest that Victoria Marlow is acting in a scribe capacity, has observed my performance of the services and has documented them in accordance with my direction.    Brian Elaine DO  Emergency Medicine  [facility] EMERGENCY DEPARTMENT  [address] [place] [id number] [phone]  Dept: [phone]     Brian Elaine DO  08/15/24 1500

## 2024-08-16 PROCEDURE — 250N000012 HC RX MED GY IP 250 OP 636 PS 637: Performed by: INTERNAL MEDICINE

## 2024-08-16 PROCEDURE — 99232 SBSQ HOSP IP/OBS MODERATE 35: CPT | Performed by: INTERNAL MEDICINE

## 2024-08-16 PROCEDURE — 94640 AIRWAY INHALATION TREATMENT: CPT | Mod: 76

## 2024-08-16 PROCEDURE — 96372 THER/PROPH/DIAG INJ SC/IM: CPT

## 2024-08-16 PROCEDURE — 999N000157 HC STATISTIC RCP TIME EA 10 MIN

## 2024-08-16 PROCEDURE — 250N000013 HC RX MED GY IP 250 OP 250 PS 637: Performed by: INTERNAL MEDICINE

## 2024-08-16 PROCEDURE — G0378 HOSPITAL OBSERVATION PER HR: HCPCS

## 2024-08-16 PROCEDURE — 120N000001 HC R&B MED SURG/OB

## 2024-08-16 PROCEDURE — 250N000011 HC RX IP 250 OP 636: Performed by: INTERNAL MEDICINE

## 2024-08-16 PROCEDURE — 250N000009 HC RX 250: Performed by: INTERNAL MEDICINE

## 2024-08-16 PROCEDURE — 96376 TX/PRO/DX INJ SAME DRUG ADON: CPT

## 2024-08-16 PROCEDURE — 94640 AIRWAY INHALATION TREATMENT: CPT

## 2024-08-16 RX ORDER — PREDNISONE 20 MG/1
20 TABLET ORAL DAILY
Status: DISCONTINUED | OUTPATIENT
Start: 2024-08-16 | End: 2024-08-17 | Stop reason: HOSPADM

## 2024-08-16 RX ORDER — CEFDINIR 300 MG/1
300 CAPSULE ORAL 2 TIMES DAILY
Status: DISCONTINUED | OUTPATIENT
Start: 2024-08-17 | End: 2024-08-17 | Stop reason: HOSPADM

## 2024-08-16 RX ORDER — AZITHROMYCIN 250 MG/1
250 TABLET, FILM COATED ORAL DAILY
Status: DISCONTINUED | OUTPATIENT
Start: 2024-08-16 | End: 2024-08-17 | Stop reason: HOSPADM

## 2024-08-16 RX ORDER — PANTOPRAZOLE SODIUM 40 MG/1
40 TABLET, DELAYED RELEASE ORAL
Status: DISCONTINUED | OUTPATIENT
Start: 2024-08-16 | End: 2024-08-17 | Stop reason: HOSPADM

## 2024-08-16 RX ADMIN — PREDNISONE 20 MG: 20 TABLET ORAL at 11:11

## 2024-08-16 RX ADMIN — AZITHROMYCIN DIHYDRATE 250 MG: 250 TABLET, FILM COATED ORAL at 11:11

## 2024-08-16 RX ADMIN — IPRATROPIUM BROMIDE AND ALBUTEROL SULFATE 3 ML: .5; 3 SOLUTION RESPIRATORY (INHALATION) at 21:03

## 2024-08-16 RX ADMIN — ENOXAPARIN SODIUM 40 MG: 40 INJECTION SUBCUTANEOUS at 22:51

## 2024-08-16 RX ADMIN — THERA TABS 1 TABLET: TAB at 09:40

## 2024-08-16 RX ADMIN — CEFTRIAXONE SODIUM 1 G: 1 INJECTION, POWDER, FOR SOLUTION INTRAMUSCULAR; INTRAVENOUS at 09:43

## 2024-08-16 RX ADMIN — Medication 1 TABLET: at 09:40

## 2024-08-16 RX ADMIN — Medication 50 MCG: at 09:40

## 2024-08-16 RX ADMIN — IPRATROPIUM BROMIDE AND ALBUTEROL SULFATE 3 ML: .5; 3 SOLUTION RESPIRATORY (INHALATION) at 08:23

## 2024-08-16 RX ADMIN — IPRATROPIUM BROMIDE AND ALBUTEROL SULFATE 3 ML: .5; 3 SOLUTION RESPIRATORY (INHALATION) at 16:46

## 2024-08-16 RX ADMIN — IPRATROPIUM BROMIDE AND ALBUTEROL SULFATE 3 ML: .5; 3 SOLUTION RESPIRATORY (INHALATION) at 12:55

## 2024-08-16 RX ADMIN — PANTOPRAZOLE SODIUM 40 MG: 40 TABLET, DELAYED RELEASE ORAL at 11:11

## 2024-08-16 ASSESSMENT — ACTIVITIES OF DAILY LIVING (ADL)
ADLS_ACUITY_SCORE: 18
ADLS_ACUITY_SCORE: 20
ADLS_ACUITY_SCORE: 18

## 2024-08-16 NOTE — PLAN OF CARE
"Goal Outcome Evaluation:                      A&Ox4. Up independently, walking frequently in hallways. Antibiotics were switched to PO today and Prednisone added. Wheezes noted in lung sounds. Tolerating regular diet. Pt states \"I'm feeling much better today\".   "

## 2024-08-16 NOTE — PLAN OF CARE
Problem: Pneumonia  Goal: Fluid Balance  Outcome: Progressing  Goal: Resolution of Infection Signs and Symptoms  8/15/2024 1932 by Rupesh Loyola RN  Outcome: Progressing  8/15/2024 1528 by Rupesh Loyola RN  Outcome: Progressing  Goal: Effective Oxygenation and Ventilation  8/15/2024 1932 by Rupesh Loyola RN  Outcome: Progressing  8/15/2024 1528 by Rupesh Loyola RN  Outcome: Progressing  Intervention: Promote Airway Secretion Clearance  Recent Flowsheet Documentation  Taken 8/15/2024 1615 by Rupesh Loyola RN  Cough And Deep Breathing: done independently per patient  Taken 8/15/2024 1400 by Rupesh Loyola RN  Cough And Deep Breathing: done independently per patient     Problem: Adult Inpatient Plan of Care  Goal: Optimal Comfort and Wellbeing  8/15/2024 1932 by Rupesh Loyola RN  Outcome: Adequate for Care Transition  8/15/2024 1528 by Rupesh Loyola RN  Outcome: Progressing     Problem: Adult Inpatient Plan of Care  Goal: Absence of Hospital-Acquired Illness or Injury  Intervention: Identify and Manage Fall Risk  Recent Flowsheet Documentation  Taken 8/15/2024 1615 by Rupesh Loyola RN  Safety Promotion/Fall Prevention:   assistive device/personal items within reach   clutter free environment maintained   increase visualization of patient   nonskid shoes/slippers when out of bed   room door open   safety round/check completed  Taken 8/15/2024 1400 by Rupesh Loyola RN  Safety Promotion/Fall Prevention:   assistive device/personal items within reach   clutter free environment maintained   increase visualization of patient   nonskid shoes/slippers when out of bed   room door open   safety round/check completed  Intervention: Prevent Skin Injury  Recent Flowsheet Documentation  Taken 8/15/2024 1615 by Rupesh Loyola RN  Body Position: position changed independently  Taken 8/15/2024 1400 by Rupesh Loyola RN  Body Position: position changed independently  Intervention: Prevent and Manage  VTE (Venous Thromboembolism) Risk  Recent Flowsheet Documentation  Taken 8/15/2024 1615 by Rupesh Loyola, RN  VTE Prevention/Management: SCDs off (sequential compression devices)  Taken 8/15/2024 1400 by Rupesh Loyola RN  VTE Prevention/Management: SCDs off (sequential compression devices)  Intervention: Prevent Infection  Recent Flowsheet Documentation  Taken 8/15/2024 1615 by Rupesh Loyola RN  Infection Prevention:   hand hygiene promoted   rest/sleep promoted  Taken 8/15/2024 1400 by Rupesh Loyola RN  Infection Prevention:   hand hygiene promoted   rest/sleep promoted   Goal Outcome Evaluation:      Patient A/O x4. VSS. Patient denies pain. She does have some SOB, but it's observed as mild. She walked with staff in hallway sating >92% on RA. Patient appetite great.

## 2024-08-16 NOTE — PLAN OF CARE
Problem: Pneumonia  Goal: Fluid Balance  Outcome: Progressing     Problem: Pneumonia  Goal: Resolution of Infection Signs and Symptoms  Outcome: Progressing     Problem: Pneumonia  Goal: Effective Oxygenation and Ventilation  Outcome: Progressing  Intervention: Promote Airway Secretion Clearance  Recent Flowsheet Documentation  Taken 8/15/2024 2341 by Talita Wise RN  Cough And Deep Breathing: done independently per patient  Taken 8/15/2024 2021 by Talita Wise RN  Cough And Deep Breathing: done independently per patient  Intervention: Optimize Oxygenation and Ventilation  Recent Flowsheet Documentation  Taken 8/15/2024 2341 by Talita Wise RN  Head of Bed (HOB) Positioning: HOB at 30-45 degrees  Taken 8/15/2024 2021 by Talita Wise RN  Head of Bed (HOB) Positioning: HOB at 30-45 degrees   Goal Outcome Evaluation: Pt alert and oriented X4. She denies pain, sob. BLS wheezes, and crackles. Intromittent productive cough. In RA. VSS. No significant event this shift.

## 2024-08-16 NOTE — PROGRESS NOTES
[facility]    Medicine Progress Note - Hospitalist Service    Date of Admission:  8/15/2024    Assessment & Plan   68 year old female with history of recent pneumonia/bronchitis (8/5/24) who is admitted on 8/15/2024 with concern for recurrent pneumonia and respiratory distress.      Acute respiratory distress  WYNN  Concern for recurrent pneumonia  No evidence of hypoxia   Not improved s/p course of levaquin and limited course of doxycycline and prednisone prior to that.   CTA chest from 3 days prior to admission showed bronchial inflammation with mucus plugging and patchy pulmonary infiltrates all typical of pneumonia.   CXR on day of admission shows bronchial wall thickening with similar appearing reticulonodular opacities left upper lobe, lingula and right lower lobe compatible with bronchitis/bronchiolitis.   Procal normal and WBC normal  BNP also normal  COVID/RSV/Flu and respiratory viral panel negative  Urine legionella and strep pneumo antigen negative  -- follow up blood and sputum cultures  -- started on IV ceftriaxone and azithromycin, will transition to cefdinir and po azithromycin to complete standard course  -- start limited course of prednisone 20mg dialy and monitor for improvement   -- continue duonebs, tessalon and robitussin prn        H/O insomnia: continue home melatonin          Diet: Regular Diet Adult    DVT Prophylaxis: Enoxaparin (Lovenox) SQ  Freeman Catheter: Not present  Lines: None     Cardiac Monitoring: None  Code Status: Full Code      Clinically Significant Risk Factors                                             Disposition Plan   Medically Ready for Discharge: Anticipated Tomorrow      Gavin Hanson DO  Hospitalist Service  [facility]  Securely message with [employer] (more info)  Text page via [employer] Paging/Directory   ______________________________________________________________________    Interval History   NAD. Still with WYNN  but improved since admission    Physical Exam   Vital Signs: Temp: 98.3  F (36.8  C) Temp src: Oral BP: 127/64 Pulse: 79   Resp: 20 SpO2: 94 % O2 Device: None (Room air)    Weight: 104 lbs 11.5 oz  General: NAD  RESPIRATORY: Breathing nonlabored  CARDIOVASCULAR: No le edema bilat.   NEUROLOGIC: Alert, speech clear         >45 MINUTES SPENT BY ME on the date of service doing chart review, history, exam, documentation & further activities per the note.  Data

## 2024-08-17 VITALS
HEART RATE: 94 BPM | DIASTOLIC BLOOD PRESSURE: 57 MMHG | OXYGEN SATURATION: 92 % | HEIGHT: 61 IN | RESPIRATION RATE: 19 BRPM | BODY MASS INDEX: 19.58 KG/M2 | SYSTOLIC BLOOD PRESSURE: 112 MMHG | WEIGHT: 103.7 LBS | TEMPERATURE: 97.9 F

## 2024-08-17 LAB
BACTERIA SPT CULT: NORMAL
GRAM STAIN RESULT: NORMAL

## 2024-08-17 PROCEDURE — G0378 HOSPITAL OBSERVATION PER HR: HCPCS

## 2024-08-17 PROCEDURE — 250N000013 HC RX MED GY IP 250 OP 250 PS 637: Performed by: INTERNAL MEDICINE

## 2024-08-17 PROCEDURE — 94640 AIRWAY INHALATION TREATMENT: CPT | Mod: 76

## 2024-08-17 PROCEDURE — 250N000009 HC RX 250: Performed by: INTERNAL MEDICINE

## 2024-08-17 PROCEDURE — 250N000012 HC RX MED GY IP 250 OP 636 PS 637: Performed by: INTERNAL MEDICINE

## 2024-08-17 PROCEDURE — 999N000157 HC STATISTIC RCP TIME EA 10 MIN

## 2024-08-17 PROCEDURE — 94640 AIRWAY INHALATION TREATMENT: CPT

## 2024-08-17 PROCEDURE — 99239 HOSP IP/OBS DSCHRG MGMT >30: CPT | Performed by: INTERNAL MEDICINE

## 2024-08-17 RX ORDER — PANTOPRAZOLE SODIUM 40 MG/1
40 TABLET, DELAYED RELEASE ORAL
Qty: 3 TABLET | Refills: 0 | Status: SHIPPED | OUTPATIENT
Start: 2024-08-18 | End: 2024-08-21

## 2024-08-17 RX ORDER — ALBUTEROL SULFATE 90 UG/1
2 AEROSOL, METERED RESPIRATORY (INHALATION) EVERY 4 HOURS PRN
Qty: 18 G | Refills: 0 | Status: SHIPPED | OUTPATIENT
Start: 2024-08-17

## 2024-08-17 RX ORDER — BENZONATATE 200 MG/1
200 CAPSULE ORAL 3 TIMES DAILY PRN
Qty: 20 CAPSULE | Refills: 0 | Status: SHIPPED | OUTPATIENT
Start: 2024-08-17

## 2024-08-17 RX ORDER — PREDNISONE 20 MG/1
20 TABLET ORAL DAILY
Qty: 3 TABLET | Refills: 0 | Status: SHIPPED | OUTPATIENT
Start: 2024-08-18 | End: 2024-08-21

## 2024-08-17 RX ORDER — CEFDINIR 300 MG/1
300 CAPSULE ORAL 2 TIMES DAILY
Qty: 11 CAPSULE | Refills: 0 | Status: SHIPPED | OUTPATIENT
Start: 2024-08-17

## 2024-08-17 RX ORDER — AZITHROMYCIN 250 MG/1
250 TABLET, FILM COATED ORAL DAILY
Qty: 2 TABLET | Refills: 0 | Status: SHIPPED | OUTPATIENT
Start: 2024-08-18 | End: 2024-08-20

## 2024-08-17 RX ADMIN — AZITHROMYCIN DIHYDRATE 250 MG: 250 TABLET, FILM COATED ORAL at 08:23

## 2024-08-17 RX ADMIN — CEFDINIR 300 MG: 300 CAPSULE ORAL at 08:22

## 2024-08-17 RX ADMIN — Medication 1 TABLET: at 08:22

## 2024-08-17 RX ADMIN — Medication 50 MCG: at 08:22

## 2024-08-17 RX ADMIN — THERA TABS 1 TABLET: TAB at 08:23

## 2024-08-17 RX ADMIN — IPRATROPIUM BROMIDE AND ALBUTEROL SULFATE 3 ML: .5; 3 SOLUTION RESPIRATORY (INHALATION) at 12:31

## 2024-08-17 RX ADMIN — PANTOPRAZOLE SODIUM 40 MG: 40 TABLET, DELAYED RELEASE ORAL at 06:32

## 2024-08-17 RX ADMIN — PREDNISONE 20 MG: 20 TABLET ORAL at 08:22

## 2024-08-17 RX ADMIN — IPRATROPIUM BROMIDE AND ALBUTEROL SULFATE 3 ML: .5; 3 SOLUTION RESPIRATORY (INHALATION) at 07:11

## 2024-08-17 ASSESSMENT — ACTIVITIES OF DAILY LIVING (ADL)
ADLS_ACUITY_SCORE: 20

## 2024-08-17 NOTE — PROGRESS NOTES
Care Management Discharge Note    Discharge Date: 08/17/2024       Discharge Disposition:  Home      Additional Information:  Discharge anticipated. No CM needs identified.    NOHEMY Pierson

## 2024-08-17 NOTE — DISCHARGE SUMMARY
[facility]  Hospitalist Discharge Summary      Date of Admission:  8/15/2024  Date of Discharge:  8/17/2024  Discharging Provider: Gavin Hanson DO  Discharge Service: Hospitalist Service        Follow-ups Needed After Discharge   Follow-up Appointments     Follow-up and recommended labs and tests       Follow up with primary care provider, [facility], within 5   days for hospital follow- up.            Unresulted Labs Ordered in the Past 30 Days of this Admission       Date and Time Order Name Status Description    8/15/2024 10:15 AM Blood Culture Arm, Right Preliminary         These results will be followed up by Hospitalist results pool    Discharge Disposition   Discharged to home  Condition at discharge: Stable      Hospital Course   68 year old female with history of recent pneumonia/bronchitis (8/5/24) who is admitted on 8/15/2024 with concern for recurrent pneumonia and respiratory distress.      Acute respiratory distress  WYNN  Concern for recurrent pneumonia  No evidence of hypoxia   Not improved s/p course of levaquin and limited course of doxycycline and prednisone prior to that.   CTA chest from 3 days prior to admission showed bronchial inflammation with mucus plugging and patchy pulmonary infiltrates all typical of pneumonia.   CXR on day of admission shows bronchial wall thickening with similar appearing reticulonodular opacities left upper lobe, lingula and right lower lobe compatible with bronchitis/bronchiolitis.   Procal normal and WBC normal  BNP also normal  COVID/RSV/Flu and respiratory viral panel negative  Urine legionella and strep pneumo antigen negative  Sputum culture showed mixed arvin  -- medically stable for discharge  -- final blood cultures pending but remain NGTD  -- started on IV ceftriaxone and azithromycin, transitioned to cefdinir and po azithromycin to complete standard course after discharge  -- plan limited course of prednisone  20mg dialy   -- continue home albuterol inhaler        H/O insomnia: continue home melatonin            Consultations This Hospital Stay   None    Code Status   Full Code    Time Spent on this Encounter   I, Gavin Hanson DO, personally saw the patient today and spent greater than 30 minutes discharging this patient.       Gavin Hanson DO  [place] [id number] [facility] [address]  Phone: [phone]  Fax: [phone]  ______________________________________________________________________    Physical Exam   Vital Signs: Temp: 97.9  F (36.6  C) Temp src: Oral BP: 112/57 Pulse: 94   Resp: 19 SpO2: 92 % O2 Device: None (Room air)    Weight: 103 lbs 11.2 oz    General: NAD  HEENT: Without congestion or inflammation  RESPIRATORY: Respirations nonlabored  CARDIOVASCULAR: No le edema bilat.  NEUROLOGIC: No focal arm or leg weakness, speech is clear    Primary Care Physician   [facility]    Discharge Orders      Reason for your hospital stay    Pneumonia     Follow-up and recommended labs and tests     Follow up with primary care provider, [facility], within 5 days for hospital follow- up.     Activity    Your activity upon discharge: activity as tolerated     Diet    Follow this diet upon discharge: Orders Placed This Encounter      Regular Diet Adult     \    Discharge Medications   Current Discharge Medication List        START taking these medications    Details   azithromycin (ZITHROMAX) 250 MG tablet Take 1 tablet (250 mg) by mouth daily for 2 days  Qty: 2 tablet, Refills: 0    Associated Diagnoses: Community acquired pneumonia, unspecified laterality      benzonatate (TESSALON) 200 MG capsule Take 1 capsule (200 mg) by mouth 3 times daily as needed for cough  Qty: 20 capsule, Refills: 0    Associated Diagnoses: Community acquired pneumonia, unspecified laterality      cefdinir (OMNICEF) 300 MG capsule Take 1 capsule (300 mg) by  mouth 2 times daily  Qty: 11 capsule, Refills: 0    Associated Diagnoses: Community acquired pneumonia, unspecified laterality      pantoprazole (PROTONIX) 40 MG EC tablet Take 1 tablet (40 mg) by mouth every morning (before breakfast) for 3 days  Qty: 3 tablet, Refills: 0    Associated Diagnoses: Community acquired pneumonia, unspecified laterality      predniSONE (DELTASONE) 20 MG tablet Take 1 tablet (20 mg) by mouth daily for 3 days  Qty: 3 tablet, Refills: 0    Associated Diagnoses: Community acquired pneumonia, unspecified laterality           CONTINUE these medications which have CHANGED    Details   albuterol (PROAIR HFA/PROVENTIL HFA/VENTOLIN HFA) 108 (90 Base) MCG/ACT inhaler Inhale 2 puffs into the lungs every 4 hours as needed for shortness of breath, wheezing or cough  Qty: 18 g, Refills: 0    Comments: Pharmacy may dispense brand covered by insurance (Proair, or proventil or ventolin or generic albuterol inhaler)  Associated Diagnoses: Community acquired pneumonia, unspecified laterality           CONTINUE these medications which have NOT CHANGED    Details   calcium carbonate-vitamin D (CALTRATE) 600-10 MG-MCG per tablet Take 1 tablet by mouth daily      cholecalciferol (VITAMIN D3) 25 mcg (1000 units) capsule Take 2 capsules by mouth daily      Melatonin 10 MG TABS tablet Take 10 mg by mouth nightly as needed for sleep      multivitamin, therapeutic (THERA-VIT) TABS tablet Take 1 tablet by mouth daily      polyethylene glycol (MIRALAX) 17 GM/Dose powder Take 0.25 Capfuls by mouth daily           Allergies   No Known Allergies

## 2024-08-17 NOTE — PLAN OF CARE
Problem: Adult Inpatient Plan of Care  Goal: Absence of Hospital-Acquired Illness or Injury  Outcome: Progressing  Intervention: Identify and Manage Fall Risk  Recent Flowsheet Documentation  Taken 8/17/2024 0020 by Serge Harrison RN  Safety Promotion/Fall Prevention:   clutter free environment maintained   nonskid shoes/slippers when out of bed   safety round/check completed  Intervention: Prevent Skin Injury  Recent Flowsheet Documentation  Taken 8/17/2024 0020 by Serge Harrison RN  Body Position: position changed independently  Intervention: Prevent Infection  Recent Flowsheet Documentation  Taken 8/17/2024 0020 by Serge Harrison RN  Infection Prevention:   hand hygiene promoted   rest/sleep promoted   single patient room provided   Goal Outcome Evaluation:       A&Ox4. Vitals stable on room air. Denies pain. Lung sounds, wheezes and diminished. Independent. Pt tearful and feels like she is not ready to go home.

## 2024-08-17 NOTE — PLAN OF CARE
"Goal Outcome Evaluation:      Plan of Care Reviewed With: patient    Overall Patient Progress: improvingOverall Patient Progress: improving    Outcome Evaluation: Pt walking halls at least 3 times this shift without complain. States she feels \"much better\" than yesterday. Hoping to discharge tomorrow. Denies SOB but states she is not to baseline. Scheduled nebs. Productive cough with white/yellow sputum and wheeze/crackles noted bilaterally. No edema noted. Denies pain. Requested something for sleep.      "

## 2024-08-17 NOTE — PLAN OF CARE
Goal Outcome Evaluation:                      A&Ox4. Pt is discharging to home. Son to transport. Discharge AVS and pt belongings sent home with pt.

## 2024-08-20 ENCOUNTER — PATIENT OUTREACH (OUTPATIENT)
Dept: CARE COORDINATION | Facility: CLINIC | Age: 69
End: 2024-08-20
Payer: COMMERCIAL

## 2024-08-20 LAB — BACTERIA BLD CULT: NO GROWTH

## 2024-08-20 NOTE — PROGRESS NOTES
Clinic Care Coordination Contact  Transitions of Care Outreach    Chief Complaint   Patient presents with    Clinic Care Coordination - Post Hospital       Most Recent Admission Date: 8/15/2024   Most Recent Admission Diagnosis: Community acquired pneumonia, unspecified laterality - J18.9     Most Recent Discharge Date: 8/17/2024   Most Recent Discharge Diagnosis: Community acquired pneumonia, unspecified laterality - J18.9     Transitions of Care Assessment    Discharge Assessment  How are you doing now that you are home?: feeling better  How are your symptoms? (Red Flag symptoms escalate to triage hotline per guidelines): Improved  Do you know how to contact your clinic care team if you have future questions or changes to your health status? : Yes  Does the patient have their discharge instructions? : Yes  Does the patient have questions regarding their discharge instructions? : No  Were you started on any new medications or were there changes to any of your previous medications? : Yes  Does the patient have all of their medications?: Yes  Do you have questions regarding any of your medications? : No  Do you have all of your needed medical supplies or equipment (DME)?  (i.e. oxygen tank, CPAP, cane, etc.): Yes    Post-op ([facility] CTA Only)  If the patient had a surgery or procedure, do they have any questions for a nurse?: No         Follow up Plan       No future appointments.  Outpatient Plan as outlined on AVS reviewed with patient.    Follow-up and recommended labs and tests  Follow up with primary care provider, [facility], within 5 days for hospital follow- up.    For any urgent concerns, please contact our 24 hour nurse triage line: [phone]       Zachery, CHW  [phone]  [facility]

## 2025-06-28 ENCOUNTER — HEALTH MAINTENANCE LETTER (OUTPATIENT)
Age: 70
End: 2025-06-28

## 2025-07-02 ENCOUNTER — HOSPITAL ENCOUNTER (EMERGENCY)
Facility: CLINIC | Age: 70
Discharge: HOME | End: 2025-07-02
Attending: FAMILY MEDICINE | Admitting: FAMILY MEDICINE
Payer: COMMERCIAL

## 2025-07-02 VITALS
RESPIRATION RATE: 16 BRPM | HEART RATE: 80 BPM | BODY MASS INDEX: 20.43 KG/M2 | DIASTOLIC BLOOD PRESSURE: 76 MMHG | HEIGHT: 62 IN | OXYGEN SATURATION: 97 % | SYSTOLIC BLOOD PRESSURE: 130 MMHG | WEIGHT: 111 LBS | TEMPERATURE: 98.3 F

## 2025-07-02 DIAGNOSIS — M71.21 POPLITEAL CYST, RIGHT: ICD-10-CM

## 2025-07-02 PROCEDURE — 99283 EMERGENCY DEPT VISIT LOW MDM: CPT | Performed by: FAMILY MEDICINE

## 2025-07-02 PROCEDURE — 99284 EMERGENCY DEPT VISIT MOD MDM: CPT | Mod: 25 | Performed by: FAMILY MEDICINE

## 2025-07-02 ASSESSMENT — ACTIVITIES OF DAILY LIVING (ADL)
ADLS_ACUITY_SCORE: 52
ADLS_ACUITY_SCORE: 52

## 2025-07-02 NOTE — ED TRIAGE NOTES
Pt arrives with c/o pain behind R knee for two weeks. Reports she was in today and had a D-dimer drawn that was elevated and is concerned for a DVT. No recent travel.

## 2025-07-02 NOTE — DISCHARGE INSTRUCTIONS
No evidence of DVT on your exam however there is a popliteal cyst as we discussed   Calf stretches and regular exercise.  If you have persistent symptoms in the right popliteal space area would recommend outpatient consultation with orthopedics.  Return if worse or changes.    
none

## 2025-07-02 NOTE — ED PROVIDER NOTES
History     Chief Complaint   Patient presents with    Leg Pain   Blood clot concern  HPI  Gay Dennison is a 69 year old female, unknown past medical history, presents to the emergency department concerns of possible blood clot in her right leg.  History is obtained from the patient who presents by herself approximate 2 weeks of right popliteal area pain by description.  The patient states that she did some very aggressive exercising on the day prior to symptom onset and has been bothering her ever since.  Worse with exertion but has not really interfered with her usual day-to-day activities.  She has decreased the amount of exercise that she has been doing.  She reports no shortness of breath or chest pain.  She went to a outside provider today and had a D-dimer done which was 0.47 with the lab cutoff for abnormal being 0.5.  She is concerned because this is very close to the 0.5 and she would like an ultrasound to make sure that she does not have a blood clot.  She has not her back provider ordered the test yet as they are very busy.      Allergies:  No Known Allergies    Problem List:    Patient Active Problem List    Diagnosis Date Noted    Community acquired pneumonia, unspecified laterality 08/15/2024     Priority: Medium        Past Medical History:    No past medical history on file.    Past Surgical History:    No past surgical history on file.    Family History:    No family history on file.    Social History:  Marital Status:   [2]        Medications:    albuterol (PROAIR HFA/PROVENTIL HFA/VENTOLIN HFA) 108 (90 Base) MCG/ACT inhaler  benzonatate (TESSALON) 200 MG capsule  calcium carbonate-vitamin D (CALTRATE) 600-10 MG-MCG per tablet  cefdinir (OMNICEF) 300 MG capsule  cholecalciferol (VITAMIN D3) 25 mcg (1000 units) capsule  Melatonin 10 MG TABS tablet  multivitamin, therapeutic (THERA-VIT) TABS tablet  polyethylene glycol (MIRALAX) 17 GM/Dose powder          Review of Systems   All  "other systems reviewed and are negative.      Physical Exam   BP: 130/76  Pulse: 80  Temp: 98.3  F (36.8  C)  Resp: 16  Height: 157.5 cm (5' 2\")  Weight: 50.3 kg (111 lb)  SpO2: 99 %      Physical Exam  Vitals and nursing note reviewed.   Constitutional:       Appearance: Normal appearance. She is normal weight.   HENT:      Head: Normocephalic and atraumatic.      Right Ear: Tympanic membrane, ear canal and external ear normal.      Left Ear: Tympanic membrane, ear canal and external ear normal.      Nose: Nose normal.      Mouth/Throat:      Mouth: Mucous membranes are dry.      Pharynx: Oropharynx is clear.   Eyes:      Extraocular Movements: Extraocular movements intact.      Conjunctiva/sclera: Conjunctivae normal.   Cardiovascular:      Rate and Rhythm: Normal rate and regular rhythm.      Pulses: Normal pulses.      Heart sounds: Normal heart sounds.   Pulmonary:      Effort: Pulmonary effort is normal.      Breath sounds: Normal breath sounds.   Abdominal:      General: Bowel sounds are normal.      Palpations: Abdomen is soft.   Musculoskeletal:      Cervical back: Normal range of motion and neck supple.        Legs:       Comments: Mild tenderness with deep palpation.  Intact neurovascular status bilateral lower extremities   Neurological:      Mental Status: She is alert.         ED Course        Procedures              Recent Results (from the past 24 hours)   US Lower Extremity Venous Duplex Right    Narrative    EXAM: US LOWER EXTREMITY VENOUS DUPLEX RIGHT  LOCATION: [facility]  DATE: 7/2/2025    INDICATION: Right popliteal space calf pain.  Evaluate for DVT  COMPARISON: None.  TECHNIQUE: Venous Duplex ultrasound of the right lower extremity with and without compression, augmentation and duplex. Color flow and spectral Doppler with waveform analysis performed.    FINDINGS: Exam includes the common femoral, femoral, popliteal, and contralateral common femoral veins as well as " segmentally visualized deep calf veins and greater saphenous vein.     RIGHT: No deep vein thrombosis. No superficial thrombophlebitis. No popliteal cyst. Right popliteal fossa cyst measuring 3.6 x 1.2 x 3 cm      Impression    IMPRESSION:  1.  No deep venous thrombosis in the right lower extremity.  2.  Small right popliteal fossa cyst   5:43 PM  Reviewed ultrasound report with the patient in the room.  We discussed symptomatic supportive care, return to usual exercise but with appropriate stretching and strengthening of calf musculature.  If persistent symptoms recommended follow-up as outpatient with orthopedics to discuss potential operative intervention.  Tylenol/ibuprofen encouraged for relief of discomfort in the short-term.  Return criteria for the emergency department reviewed.      Medications - No data to display    Assessments & Plan (with Medical Decision Making)   Assessment and plan with Medical Decision Making at the time stamp above    Disclaimer: This note consists of symbols derived from keyboarding, dictation and/or voice recognition software. As a result, there may be errors in the script that have gone undetected. Please consider this when interpreting information found in this chart.      I have reviewed the nursing notes.    I have reviewed the findings, diagnosis, plan and need for follow up with the patient.        New Prescriptions    No medications on file       Final diagnoses:   Popliteal cyst, right       7/2/2025   [facility] EMERGENCY DEPT       Juan J Hernandez MD  07/02/25 1026